# Patient Record
Sex: FEMALE | HISPANIC OR LATINO | Employment: UNEMPLOYED | URBAN - METROPOLITAN AREA
[De-identification: names, ages, dates, MRNs, and addresses within clinical notes are randomized per-mention and may not be internally consistent; named-entity substitution may affect disease eponyms.]

---

## 2019-10-15 ENCOUNTER — OFFICE VISIT (OUTPATIENT)
Dept: MEDICAL GROUP | Facility: MEDICAL CENTER | Age: 64
End: 2019-10-15
Payer: MEDICARE

## 2019-10-15 VITALS
SYSTOLIC BLOOD PRESSURE: 122 MMHG | RESPIRATION RATE: 16 BRPM | HEART RATE: 60 BPM | OXYGEN SATURATION: 97 % | HEIGHT: 60 IN | DIASTOLIC BLOOD PRESSURE: 64 MMHG | WEIGHT: 155.2 LBS | BODY MASS INDEX: 30.47 KG/M2 | TEMPERATURE: 97.5 F

## 2019-10-15 DIAGNOSIS — Z13.220 LIPID SCREENING: ICD-10-CM

## 2019-10-15 DIAGNOSIS — R73.09 ELEVATED HEMOGLOBIN A1C: ICD-10-CM

## 2019-10-15 DIAGNOSIS — F32.2 CURRENT SEVERE EPISODE OF MAJOR DEPRESSIVE DISORDER WITHOUT PSYCHOTIC FEATURES WITHOUT PRIOR EPISODE (HCC): ICD-10-CM

## 2019-10-15 DIAGNOSIS — Z23 NEED FOR VACCINATION: ICD-10-CM

## 2019-10-15 DIAGNOSIS — Z78.0 POSTMENOPAUSAL STATUS: ICD-10-CM

## 2019-10-15 DIAGNOSIS — K21.9 GASTROESOPHAGEAL REFLUX DISEASE WITHOUT ESOPHAGITIS: ICD-10-CM

## 2019-10-15 DIAGNOSIS — Z12.39 SCREENING FOR BREAST CANCER: ICD-10-CM

## 2019-10-15 PROBLEM — F32.9 CURRENT EPISODE OF MAJOR DEPRESSIVE DISORDER WITHOUT PRIOR EPISODE: Status: ACTIVE | Noted: 2019-10-15

## 2019-10-15 PROCEDURE — 90662 IIV NO PRSV INCREASED AG IM: CPT | Performed by: NURSE PRACTITIONER

## 2019-10-15 PROCEDURE — 99214 OFFICE O/P EST MOD 30 MIN: CPT | Mod: 25 | Performed by: NURSE PRACTITIONER

## 2019-10-15 PROCEDURE — G0008 ADMIN INFLUENZA VIRUS VAC: HCPCS | Performed by: NURSE PRACTITIONER

## 2019-10-15 RX ORDER — OMEPRAZOLE 20 MG/1
20 CAPSULE, DELAYED RELEASE ORAL DAILY
Qty: 30 CAP | Refills: 1 | Status: SHIPPED | OUTPATIENT
Start: 2019-10-15 | End: 2020-03-27 | Stop reason: SDUPTHER

## 2019-10-15 RX ORDER — FLUOXETINE 10 MG/1
10 CAPSULE ORAL DAILY
Qty: 30 CAP | Refills: 1 | Status: SHIPPED | OUTPATIENT
Start: 2019-10-15 | End: 2019-11-12

## 2019-10-15 ASSESSMENT — PATIENT HEALTH QUESTIONNAIRE - PHQ9
2. FEELING DOWN, DEPRESSED, IRRITABLE, OR HOPELESS: 3
3. TROUBLE FALLING OR STAYING ASLEEP OR SLEEPING TOO MUCH: 3
5. POOR APPETITE OR OVEREATING: 3
6. FEELING BAD ABOUT YOURSELF - OR THAT YOU ARE A FAILURE OR HAVE LET YOURSELF OR YOUR FAMILY DOWN: 3
7. TROUBLE CONCENTRATING ON THINGS, SUCH AS READING THE NEWSPAPER OR WATCHING TELEVISION: 3
4. FEELING TIRED OR HAVING LITTLE ENERGY: 3
9. THOUGHTS THAT YOU WOULD BE BETTER OFF DEAD, OR OF HURTING YOURSELF: 0
SUM OF ALL RESPONSES TO PHQ QUESTIONS 1-9: 21
CLINICAL INTERPRETATION OF PHQ2 SCORE: 0
SUM OF ALL RESPONSES TO PHQ9 QUESTIONS 1 AND 2: 6
1. LITTLE INTEREST OR PLEASURE IN DOING THINGS: 3
8. MOVING OR SPEAKING SO SLOWLY THAT OTHER PEOPLE COULD HAVE NOTICED. OR THE OPPOSITE, BEING SO FIGETY OR RESTLESS THAT YOU HAVE BEEN MOVING AROUND A LOT MORE THAN USUAL: 0

## 2019-10-15 NOTE — ASSESSMENT & PLAN NOTE
Chronic issue dating back years, seems to wax and wane. Worse now in the 2 weeks or so. No dietary changes. Feels burning sensation, nausea, belching.  No particular triggers, she is not recently been taking any medications for this.  No weight loss, vomiting, some dysphasia, chronic cough reported

## 2019-10-15 NOTE — PROGRESS NOTES
Subjective:     Chief Complaint   Patient presents with   • Abdominal Pain   • Depression     Cathy Wilkinson is a 64 y.o. female established patient who was last seen by me in 2016, here today with her daughter who is helping with translation.  We discussed:    Gastroesophageal reflux disease without esophagitis  Chronic issue dating back years, seems to wax and wane. Worse now in the 2 weeks or so. No dietary changes. Feels burning sensation, nausea, belching.  No particular triggers, she is not recently been taking any medications for this.  No weight loss, vomiting, some dysphasia, chronic cough reported    Current episode of major depressive disorder without prior episode  More difficulty with moodiness, irritability, feeling depressed after having lost her mother.  She is having more conflicts with her daughter which is creating additional strain, she does feel that she needs medication to help with her mood.  No SI/HI, history of manic behavior, substance abuse  Patient Health Questionaire    Little interest or pleasure in doing things?: 3   Feeling down, depressed, or hopeless?: 3  Trouble falling or staying asleep, or sleeping too much? : 3  Feeling tired or having little energy? : 3  Poor appetite or overeating? : 3  Feeling bad about yourself - or that you are a failure or have let yourself or your family down? : 3  Trouble concentrating on things, such as reading the newspaper or watching television? : 3  Moving or speaking so slowly that other people could have noticed.  Or the opposite - being so fidgety or restless that you have been moving around alot more than usual. : 0  Thoughts that you would be better off dead, or of hurting yourself?: 0  Patient Health Questionnaire Score: 21    If depressive symptoms identified deferred to follow up visit unless specifically addressed in assesment and plan.    Interpretation of PHQ-9 Total Score   Score Severity   1-4 No Depression   5-9 Mild Depression    10-14 Moderate Depression   15-19 Moderately Severe Depression   20-27 Severe Depression           Current medicines (including changes today)  Current Outpatient Medications   Medication Sig Dispense Refill   • omeprazole (PRILOSEC) 20 MG delayed-release capsule Take 1 Cap by mouth every day. 30 Cap 1   • FLUoxetine (PROZAC) 10 MG Cap Take 1 Cap by mouth every day. 30 Cap 1   • aspirin (ASA) 81 MG CHEW Take 81 mg by mouth every day.       No current facility-administered medications for this visit.      She  has no past medical history on file.    ROS included above     Objective:     /64 (BP Location: Left arm, Patient Position: Sitting, BP Cuff Size: Adult)   Pulse 60   Temp 36.4 °C (97.5 °F) (Temporal)   Resp 16   Ht 1.524 m (5')   Wt 70.4 kg (155 lb 3.3 oz)   SpO2 97%  Body mass index is 30.31 kg/m².     Physical Exam:  General: Alert, oriented in no acute distress.  Eye contact is good, speech is normal, affect calm  Lungs: clear to auscultation bilaterally, normal effort, no wheeze/ rhonchi/ rales.  CV: regular rate and rhythm, S1, S2, no murmur  Abdomen: soft, nontender, nondistended  Ext: no edema, color normal, vascularity normal, temperature normal    Assessment and Plan:   The following treatment plan was discussed   1. Current severe episode of major depressive disorder without psychotic features without prior episode (HCC)   treatment options reviewed, will start 10 mg Prozac daily.  Risks and benefits of SSRIs discussed.  Side effects reviewed, follow-up in 1 month  FLUoxetine (PROZAC) 10 MG Cap   2. Gastroesophageal reflux disease without esophagitis   persistent difficulty, worse in the last 2 weeks.  Screen for H pylori.  Dietary factors discussed including caffeine, alcohol.  She is a non-smoker.  Will start omeprazole, instructions for use discussed, follow-up at next visit  H.PYLORI STOOL ANTIGEN    Comp Metabolic Panel    omeprazole (PRILOSEC) 20 MG delayed-release capsule   3.  Elevated hemoglobin A1c   review of labs shows very slightly elevated A1c in 2016.  Update labs  HEMOGLOBIN A1C   4. Postmenopausal status  DS-BONE DENSITY STUDY (DEXA)   5. Screening for breast cancer  MA-SCREEN MAMMO W/CAD-BILAT   6. Lipid screening  Lipid Profile   7. Need for vaccination  I have placed the below orders and discussed them with an approved delegating provider. The MA is performing the below orders under the direction of Dr. Lema  Influenza Vaccine, High Dose (65+ Only)       Followup: Return in about 1 month (around 11/15/2019).         Please note that this dictation was created using voice recognition software. I have worked with consultants from the vendor as well as technical experts from [employer] [facility] [employer] to optimize the interface. I have made every reasonable attempt to correct obvious errors, but I expect that there are errors of grammar and possibly content that I did not discover before finalizing the note.

## 2019-10-15 NOTE — ASSESSMENT & PLAN NOTE
More difficulty with moodiness, irritability, feeling depressed after having lost her mother.  She is having more conflicts with her daughter which is creating additional strain, she does feel that she needs medication to help with her mood.  No SI/HI, history of manic behavior, substance abuse  Patient Health Questionaire    Little interest or pleasure in doing things?: 3   Feeling down, depressed, or hopeless?: 3  Trouble falling or staying asleep, or sleeping too much? : 3  Feeling tired or having little energy? : 3  Poor appetite or overeating? : 3  Feeling bad about yourself - or that you are a failure or have let yourself or your family down? : 3  Trouble concentrating on things, such as reading the newspaper or watching television? : 3  Moving or speaking so slowly that other people could have noticed.  Or the opposite - being so fidgety or restless that you have been moving around alot more than usual. : 0  Thoughts that you would be better off dead, or of hurting yourself?: 0  Patient Health Questionnaire Score: 21    If depressive symptoms identified deferred to follow up visit unless specifically addressed in assesment and plan.    Interpretation of PHQ-9 Total Score   Score Severity   1-4 No Depression   5-9 Mild Depression   10-14 Moderate Depression   15-19 Moderately Severe Depression   20-27 Severe Depression

## 2019-10-22 ENCOUNTER — HOSPITAL ENCOUNTER (OUTPATIENT)
Dept: LAB | Facility: MEDICAL CENTER | Age: 64
End: 2019-10-22
Attending: NURSE PRACTITIONER
Payer: MEDICARE

## 2019-10-22 DIAGNOSIS — K21.9 GASTROESOPHAGEAL REFLUX DISEASE WITHOUT ESOPHAGITIS: ICD-10-CM

## 2019-10-22 DIAGNOSIS — Z13.220 LIPID SCREENING: ICD-10-CM

## 2019-10-22 DIAGNOSIS — R73.09 ELEVATED HEMOGLOBIN A1C: ICD-10-CM

## 2019-10-22 LAB
ALBUMIN SERPL BCP-MCNC: 4 G/DL (ref 3.2–4.9)
ALBUMIN/GLOB SERPL: 1.3 G/DL
ALP SERPL-CCNC: 81 U/L (ref 30–99)
ALT SERPL-CCNC: 20 U/L (ref 2–50)
ANION GAP SERPL CALC-SCNC: 9 MMOL/L (ref 0–11.9)
AST SERPL-CCNC: 19 U/L (ref 12–45)
BILIRUB SERPL-MCNC: 0.5 MG/DL (ref 0.1–1.5)
BUN SERPL-MCNC: 12 MG/DL (ref 8–22)
CALCIUM SERPL-MCNC: 9 MG/DL (ref 8.5–10.5)
CHLORIDE SERPL-SCNC: 106 MMOL/L (ref 96–112)
CHOLEST SERPL-MCNC: 195 MG/DL (ref 100–199)
CO2 SERPL-SCNC: 27 MMOL/L (ref 20–33)
CREAT SERPL-MCNC: 0.61 MG/DL (ref 0.5–1.4)
EST. AVERAGE GLUCOSE BLD GHB EST-MCNC: 128 MG/DL
GLOBULIN SER CALC-MCNC: 3.2 G/DL (ref 1.9–3.5)
GLUCOSE SERPL-MCNC: 81 MG/DL (ref 65–99)
HBA1C MFR BLD: 6.1 % (ref 0–5.6)
HDLC SERPL-MCNC: 75 MG/DL
LDLC SERPL CALC-MCNC: 99 MG/DL
POTASSIUM SERPL-SCNC: 3.8 MMOL/L (ref 3.6–5.5)
PROT SERPL-MCNC: 7.2 G/DL (ref 6–8.2)
SODIUM SERPL-SCNC: 142 MMOL/L (ref 135–145)
TRIGL SERPL-MCNC: 105 MG/DL (ref 0–149)

## 2019-10-22 PROCEDURE — 80061 LIPID PANEL: CPT | Mod: GA

## 2019-10-22 PROCEDURE — 36415 COLL VENOUS BLD VENIPUNCTURE: CPT | Mod: GA

## 2019-10-22 PROCEDURE — 83036 HEMOGLOBIN GLYCOSYLATED A1C: CPT | Mod: GA

## 2019-10-22 PROCEDURE — 80053 COMPREHEN METABOLIC PANEL: CPT

## 2019-10-23 ENCOUNTER — HOSPITAL ENCOUNTER (OUTPATIENT)
Facility: MEDICAL CENTER | Age: 64
End: 2019-10-23
Attending: NURSE PRACTITIONER
Payer: MEDICARE

## 2019-10-23 DIAGNOSIS — K21.9 GASTROESOPHAGEAL REFLUX DISEASE WITHOUT ESOPHAGITIS: ICD-10-CM

## 2019-10-23 LAB — H PYLORI AG STL QL IA: DETECTED

## 2019-10-23 PROCEDURE — 87338 HPYLORI STOOL AG IA: CPT | Mod: GZ

## 2019-11-12 ENCOUNTER — OFFICE VISIT (OUTPATIENT)
Dept: MEDICAL GROUP | Facility: MEDICAL CENTER | Age: 64
End: 2019-11-12
Payer: MEDICARE

## 2019-11-12 VITALS
BODY MASS INDEX: 30.82 KG/M2 | SYSTOLIC BLOOD PRESSURE: 102 MMHG | WEIGHT: 157 LBS | HEIGHT: 60 IN | RESPIRATION RATE: 16 BRPM | DIASTOLIC BLOOD PRESSURE: 70 MMHG | HEART RATE: 80 BPM | TEMPERATURE: 97.6 F | OXYGEN SATURATION: 95 %

## 2019-11-12 DIAGNOSIS — Z23 NEED FOR VACCINATION: ICD-10-CM

## 2019-11-12 DIAGNOSIS — A04.8 H. PYLORI INFECTION: ICD-10-CM

## 2019-11-12 DIAGNOSIS — F32.2 CURRENT SEVERE EPISODE OF MAJOR DEPRESSIVE DISORDER WITHOUT PSYCHOTIC FEATURES WITHOUT PRIOR EPISODE (HCC): ICD-10-CM

## 2019-11-12 DIAGNOSIS — K21.9 GASTROESOPHAGEAL REFLUX DISEASE WITHOUT ESOPHAGITIS: ICD-10-CM

## 2019-11-12 PROCEDURE — 90670 PCV13 VACCINE IM: CPT | Performed by: NURSE PRACTITIONER

## 2019-11-12 PROCEDURE — 99214 OFFICE O/P EST MOD 30 MIN: CPT | Mod: 25 | Performed by: NURSE PRACTITIONER

## 2019-11-12 PROCEDURE — G0009 ADMIN PNEUMOCOCCAL VACCINE: HCPCS | Performed by: NURSE PRACTITIONER

## 2019-11-12 RX ORDER — OMEPRAZOLE 20 MG/1
20 CAPSULE, DELAYED RELEASE ORAL 2 TIMES DAILY
Qty: 28 CAP | Refills: 0 | Status: SHIPPED | OUTPATIENT
Start: 2019-11-12 | End: 2019-12-10

## 2019-11-12 RX ORDER — FLUOXETINE HYDROCHLORIDE 20 MG/1
20 CAPSULE ORAL DAILY
Qty: 30 CAP | Refills: 1 | Status: SHIPPED | OUTPATIENT
Start: 2019-11-12 | End: 2019-12-10 | Stop reason: SDUPTHER

## 2019-11-12 RX ORDER — AMOXICILLIN 500 MG/1
1000 CAPSULE ORAL 2 TIMES DAILY
Qty: 56 CAP | Refills: 0 | Status: SHIPPED | OUTPATIENT
Start: 2019-11-12 | End: 2019-12-10

## 2019-11-12 RX ORDER — CLARITHROMYCIN 500 MG/1
500 TABLET, COATED ORAL 2 TIMES DAILY
Qty: 28 TAB | Refills: 0 | Status: SHIPPED | OUTPATIENT
Start: 2019-11-12 | End: 2019-12-10

## 2019-11-12 ASSESSMENT — PATIENT HEALTH QUESTIONNAIRE - PHQ9
2. FEELING DOWN, DEPRESSED, IRRITABLE, OR HOPELESS: 0
1. LITTLE INTEREST OR PLEASURE IN DOING THINGS: 2
4. FEELING TIRED OR HAVING LITTLE ENERGY: 1
SUM OF ALL RESPONSES TO PHQ QUESTIONS 1-9: 13
7. TROUBLE CONCENTRATING ON THINGS, SUCH AS READING THE NEWSPAPER OR WATCHING TELEVISION: 2
9. THOUGHTS THAT YOU WOULD BE BETTER OFF DEAD, OR OF HURTING YOURSELF: 0
5. POOR APPETITE OR OVEREATING: 2
3. TROUBLE FALLING OR STAYING ASLEEP OR SLEEPING TOO MUCH: 3
8. MOVING OR SPEAKING SO SLOWLY THAT OTHER PEOPLE COULD HAVE NOTICED. OR THE OPPOSITE, BEING SO FIGETY OR RESTLESS THAT YOU HAVE BEEN MOVING AROUND A LOT MORE THAN USUAL: 0
6. FEELING BAD ABOUT YOURSELF - OR THAT YOU ARE A FAILURE OR HAVE LET YOURSELF OR YOUR FAMILY DOWN: 3
SUM OF ALL RESPONSES TO PHQ9 QUESTIONS 1 AND 2: 2

## 2019-11-12 NOTE — ASSESSMENT & PLAN NOTE
Started prozac 10 mg daily one month ago.  She does feel that she has had some improvement over the last few weeks although still feeling somewhat depressed.  She is more relaxed.  She did feel that the medication made her jittery the first few days but this is subsided.  No nausea or headache related to medication.  Last PHQ 9 score 21  Patient Health Questionaire    Little interest or pleasure in doing things?: 2   Feeling down, depressed, or hopeless?: 0  Trouble falling or staying asleep, or sleeping too much? : 3  Feeling tired or having little energy? : 1  Poor appetite or overeating? : 2  Feeling bad about yourself - or that you are a failure or have let yourself or your family down? : 3  Trouble concentrating on things, such as reading the newspaper or watching television? : 2  Moving or speaking so slowly that other people could have noticed.  Or the opposite - being so fidgety or restless that you have been moving around alot more than usual. : 0  Thoughts that you would be better off dead, or of hurting yourself?: 0  Patient Health Questionnaire Score: 13    If depressive symptoms identified deferred to follow up visit unless specifically addressed in assesment and plan.    Interpretation of PHQ-9 Total Score   Score Severity   1-4 No Depression   5-9 Mild Depression   10-14 Moderate Depression   15-19 Moderately Severe Depression   20-27 Severe Depression

## 2019-11-12 NOTE — ASSESSMENT & PLAN NOTE
Pt initially seen with upper abd pain and reflux in Oct. Screening for h. Pylori was positive, unfortunately we had difficulty contacting the patient so she has not yet been treated. She does report symptoms have improved with omeprazole 20 mg daily.  No nausea, bloating, significant pain, stool changes

## 2019-11-12 NOTE — PROGRESS NOTES
Subjective:     Chief Complaint   Patient presents with   • Follow-Up     Cathy Wilkinson is a 64 y.o. female here today to follow up on:    H. pylori infection  Pt initially seen with upper abd pain and reflux in Oct. Screening for h. Pylori was positive, unfortunately we had difficulty contacting the patient so she has not yet been treated. She does report symptoms have improved with omeprazole 20 mg daily.  No nausea, bloating, significant pain, stool changes    Current episode of major depressive disorder without prior episode  Started prozac 10 mg daily one month ago.  She does feel that she has had some improvement over the last few weeks although still feeling somewhat depressed.  She is more relaxed.  She did feel that the medication made her jittery the first few days but this is subsided.  No nausea or headache related to medication.  Last PHQ 9 score 21  Patient Health Questionaire    Little interest or pleasure in doing things?: 2   Feeling down, depressed, or hopeless?: 0  Trouble falling or staying asleep, or sleeping too much? : 3  Feeling tired or having little energy? : 1  Poor appetite or overeating? : 2  Feeling bad about yourself - or that you are a failure or have let yourself or your family down? : 3  Trouble concentrating on things, such as reading the newspaper or watching television? : 2  Moving or speaking so slowly that other people could have noticed.  Or the opposite - being so fidgety or restless that you have been moving around alot more than usual. : 0  Thoughts that you would be better off dead, or of hurting yourself?: 0  Patient Health Questionnaire Score: 13    If depressive symptoms identified deferred to follow up visit unless specifically addressed in assesment and plan.    Interpretation of PHQ-9 Total Score   Score Severity   1-4 No Depression   5-9 Mild Depression   10-14 Moderate Depression   15-19 Moderately Severe Depression   20-27 Severe Depression           Current  medicines (including changes today)  Current Outpatient Medications   Medication Sig Dispense Refill   • clarithromycin (BIAXIN) 500 MG Tab Take 1 Tab by mouth 2 times a day. 28 Tab 0   • amoxicillin (AMOXIL) 500 MG Cap Take 2 Caps by mouth 2 times a day. 56 Cap 0   • omeprazole (PRILOSEC) 20 MG delayed-release capsule Take 1 Cap by mouth 2 times a day. 28 Cap 0   • FLUoxetine (PROZAC) 20 MG Cap Take 1 Cap by mouth every day. 30 Cap 1   • omeprazole (PRILOSEC) 20 MG delayed-release capsule Take 1 Cap by mouth every day. 30 Cap 1   • aspirin (ASA) 81 MG CHEW Take 81 mg by mouth every day.       No current facility-administered medications for this visit.      She  has no past medical history on file.    ROS included above     Objective:     /70 (BP Location: Left arm, Patient Position: Sitting, BP Cuff Size: Adult)   Pulse 80   Temp 36.4 °C (97.6 °F) (Temporal)   Resp 16   Ht 1.524 m (5')   Wt 71.2 kg (157 lb)   SpO2 95%  Body mass index is 30.66 kg/m².     Physical Exam:  General: Alert, oriented in no acute distress.  Eye contact is good, speech is normal, affect calm  Lungs: clear to auscultation bilaterally, normal effort, no wheeze/ rhonchi/ rales.  CV: regular rate and rhythm, S1, S2, no murmur  Abdomen: soft, nontender, nondistended  Ext: no edema, color normal, vascularity normal, temperature normal    Assessment and Plan:   The following treatment plan was discussed  1. H. pylori infection   positive stool test for H. pylori.  Antibiotic treatment plan reviewed increase omeprazole to twice daily x2 weeks.  We will follow-up on this at next visit  clarithromycin (BIAXIN) 500 MG Tab    amoxicillin (AMOXIL) 500 MG Cap    omeprazole (PRILOSEC) 20 MG delayed-release capsule   2. Gastroesophageal reflux disease without esophagitis   as discussed above   3. Current severe episode of major depressive disorder without psychotic features without prior episode (HCC)   some improvement with PHQ 9 score  reducing from 21-13 although she does feel that she may do better with an increased dose.  We will increase Prozac to 20 mg daily, follow-up in 1 month   4. Need for vaccination  I have placed the below orders and discussed them with an approved delegating provider. The MA is performing the below orders under the direction of Dr. Lema  Pneumococcal Conjugate Vaccine 13-Valent       Followup: 1 month         Please note that this dictation was created using voice recognition software. I have worked with consultants from the vendor as well as technical experts from [employer] to optimize the interface. I have made every reasonable attempt to correct obvious errors, but I expect that there are errors of grammar and possibly content that I did not discover before finalizing the note.

## 2019-11-19 ENCOUNTER — HOSPITAL ENCOUNTER (OUTPATIENT)
Dept: RADIOLOGY | Facility: MEDICAL CENTER | Age: 64
End: 2019-11-19
Attending: NURSE PRACTITIONER
Payer: MEDICARE

## 2019-11-19 DIAGNOSIS — Z12.31 VISIT FOR SCREENING MAMMOGRAM: ICD-10-CM

## 2019-11-19 DIAGNOSIS — Z78.0 POSTMENOPAUSAL STATUS: ICD-10-CM

## 2019-11-19 PROCEDURE — 77063 BREAST TOMOSYNTHESIS BI: CPT

## 2019-11-19 PROCEDURE — 77080 DXA BONE DENSITY AXIAL: CPT

## 2019-12-10 ENCOUNTER — HOSPITAL ENCOUNTER (OUTPATIENT)
Facility: MEDICAL CENTER | Age: 64
End: 2019-12-10
Attending: NURSE PRACTITIONER
Payer: MEDICARE

## 2019-12-10 ENCOUNTER — OFFICE VISIT (OUTPATIENT)
Dept: MEDICAL GROUP | Facility: MEDICAL CENTER | Age: 64
End: 2019-12-10
Payer: MEDICARE

## 2019-12-10 VITALS
DIASTOLIC BLOOD PRESSURE: 70 MMHG | BODY MASS INDEX: 30.43 KG/M2 | OXYGEN SATURATION: 97 % | SYSTOLIC BLOOD PRESSURE: 108 MMHG | RESPIRATION RATE: 16 BRPM | HEART RATE: 84 BPM | HEIGHT: 60 IN | TEMPERATURE: 96.7 F | WEIGHT: 155 LBS

## 2019-12-10 DIAGNOSIS — Z01.419 WELL WOMAN EXAM WITH ROUTINE GYNECOLOGICAL EXAM: ICD-10-CM

## 2019-12-10 DIAGNOSIS — F32.2 CURRENT SEVERE EPISODE OF MAJOR DEPRESSIVE DISORDER WITHOUT PSYCHOTIC FEATURES WITHOUT PRIOR EPISODE (HCC): ICD-10-CM

## 2019-12-10 DIAGNOSIS — A04.8 H. PYLORI INFECTION: ICD-10-CM

## 2019-12-10 PROCEDURE — 87624 HPV HI-RISK TYP POOLED RSLT: CPT

## 2019-12-10 PROCEDURE — G0101 CA SCREEN;PELVIC/BREAST EXAM: HCPCS | Performed by: NURSE PRACTITIONER

## 2019-12-10 PROCEDURE — 88175 CYTOPATH C/V AUTO FLUID REDO: CPT

## 2019-12-10 PROCEDURE — 99213 OFFICE O/P EST LOW 20 MIN: CPT | Mod: 25 | Performed by: NURSE PRACTITIONER

## 2019-12-10 RX ORDER — FLUOXETINE HYDROCHLORIDE 20 MG/1
20 CAPSULE ORAL DAILY
Qty: 90 CAP | Refills: 3 | Status: SHIPPED | OUTPATIENT
Start: 2019-12-10 | End: 2021-04-06 | Stop reason: SDUPTHER

## 2019-12-10 NOTE — PROGRESS NOTES
CC:  Pap/Well Woman Exam    History of present illness:  Cathy Wilkinson is 64 y.o. G7, P6 postmenopausal female presenting today for well woman exam with gynecological exam and Pap smear.   Last Pap about 4 years ago, normal.  Not sexually active.  Up-to-date on mammogram  H. pylori infection  Completed antibiotic treatment about 2 weeks ago, she has continued with omeprazole.  Denies any acid reflux, abdominal bloating or discomfort    Current episode of major depressive disorder without prior episode  Improved with Prozac 20 mg daily, denies any current concerns with depression, anxiety      No past medical history on file.    No past surgical history on file.    Outpatient Encounter Medications as of 12/10/2019   Medication Sig Dispense Refill   • FLUoxetine (PROZAC) 20 MG Cap Take 1 Cap by mouth every day. 30 Cap 1   • omeprazole (PRILOSEC) 20 MG delayed-release capsule Take 1 Cap by mouth every day. 30 Cap 1   • aspirin (ASA) 81 MG CHEW Take 81 mg by mouth every day.     • [DISCONTINUED] clarithromycin (BIAXIN) 500 MG Tab Take 1 Tab by mouth 2 times a day. (Patient not taking: Reported on 12/10/2019) 28 Tab 0   • [DISCONTINUED] amoxicillin (AMOXIL) 500 MG Cap Take 2 Caps by mouth 2 times a day. (Patient not taking: Reported on 12/10/2019) 56 Cap 0   • [DISCONTINUED] omeprazole (PRILOSEC) 20 MG delayed-release capsule Take 1 Cap by mouth 2 times a day. (Patient not taking: Reported on 12/10/2019) 28 Cap 0     No facility-administered encounter medications on file as of 12/10/2019.        Patient Active Problem List    Diagnosis Date Noted   • H. pylori infection 11/12/2019   • Current episode of major depressive disorder without prior episode 10/15/2019   • Vertigo 10/20/2016   • Preventative health care 10/20/2016   • Gastroesophageal reflux disease without esophagitis 02/18/2016   • Chronic constipation 02/18/2016       .  Social History     Socioeconomic History   • Marital status: Unknown     Spouse name:  Not on file   • Number of children: Not on file   • Years of education: Not on file   • Highest education level: Not on file   Occupational History   • Not on file   Social Needs   • Financial resource strain: Not on file   • Food insecurity:     Worry: Not on file     Inability: Not on file   • Transportation needs:     Medical: Not on file     Non-medical: Not on file   Tobacco Use   • Smoking status: Never Smoker   • Smokeless tobacco: Never Used   Substance and Sexual Activity   • Alcohol use: Yes     Alcohol/week: 0.0 oz   • Drug use: No   • Sexual activity: Not on file   Lifestyle   • Physical activity:     Days per week: Not on file     Minutes per session: Not on file   • Stress: Not on file   Relationships   • Social connections:     Talks on phone: Not on file     Gets together: Not on file     Attends Jewish service: Not on file     Active member of club or organization: Not on file     Attends meetings of clubs or organizations: Not on file     Relationship status: Not on file   • Intimate partner violence:     Fear of current or ex partner: Not on file     Emotionally abused: Not on file     Physically abused: Not on file     Forced sexual activity: Not on file   Other Topics Concern   • Not on file   Social History Narrative   • Not on file       Family History   Problem Relation Age of Onset   • Lung Disease Father    • Cancer Brother    • Cancer Paternal Aunt         breast         ROS: Denies Weight loss, fatigue, chest pain, SOB, bowel or bladder changes. No concerning vaginal discharge. Denies h/o migraine with aura. Denies musculoskeletal, neurological, or psychiatric problems.      /70 (BP Location: Left arm, Patient Position: Sitting, BP Cuff Size: Adult)   Pulse 84   Temp 35.9 °C (96.7 °F) (Temporal)   Resp 16   Ht 1.524 m (5')   Wt 70.3 kg (155 lb)   SpO2 97%   BMI 30.27 kg/m²     GEN:  Appears well and in no apparent distress   NECK:  Supple without adenopathy or  thyromegaly  LUNGS:  Clear and equal. No wheeze, ronchi, or rales.  CV:  RRR, S1, S2. No murmur.  Pedal pulses 2+ bilaterally.  BREAST:  Symmetrical without masses. No nipple discharge.  ABD:  Soft, non-tender, non-distended, normal bowel sounds.  No hepatosplenomegaly.  :  Normal external female genitalia.  Vaginal canal clear.  Cervix appears normal. Specimen collected from transformation zone. Bimanual exam:  No CMT, normal size uterus without masses or tenderness; no adnexal masses or tenderness.      Assessment and plan  1. Well woman exam with routine gynecological exam  Normal GYN exam. Pap sent, follow-up pending results. General health and wellness discussion including healthy diet, regular exercise.   - THINPREP PAP WITH HPV; Future    2. H. pylori infection  Completed antibiotic treatment, she may now discontinue omeprazole.  Notify me for recurrence of GERD    3. Current severe episode of major depressive disorder without psychotic features without prior episode (HCC)  Doing well with Prozac, continue present dose      F/u pending results

## 2019-12-10 NOTE — ASSESSMENT & PLAN NOTE
Completed antibiotic treatment about 2 weeks ago, she has continued with omeprazole.  Denies any acid reflux, abdominal bloating or discomfort

## 2019-12-11 LAB
CYTOLOGY REG CYTOL: NORMAL
HPV HR 12 DNA CVX QL NAA+PROBE: NEGATIVE
HPV16 DNA SPEC QL NAA+PROBE: NEGATIVE
HPV18 DNA SPEC QL NAA+PROBE: NEGATIVE
SPECIMEN SOURCE: NORMAL

## 2020-03-27 ENCOUNTER — PATIENT MESSAGE (OUTPATIENT)
Dept: MEDICAL GROUP | Facility: MEDICAL CENTER | Age: 65
End: 2020-03-27

## 2020-03-27 DIAGNOSIS — K21.9 GASTROESOPHAGEAL REFLUX DISEASE WITHOUT ESOPHAGITIS: ICD-10-CM

## 2020-03-27 RX ORDER — OMEPRAZOLE 20 MG/1
20 CAPSULE, DELAYED RELEASE ORAL DAILY
Qty: 30 CAP | Refills: 1 | Status: SHIPPED | OUTPATIENT
Start: 2020-03-27 | End: 2021-04-06

## 2020-05-13 ENCOUNTER — PATIENT MESSAGE (OUTPATIENT)
Dept: MEDICAL GROUP | Facility: MEDICAL CENTER | Age: 65
End: 2020-05-13

## 2020-05-13 DIAGNOSIS — K21.9 GASTROESOPHAGEAL REFLUX DISEASE WITHOUT ESOPHAGITIS: ICD-10-CM

## 2020-05-13 DIAGNOSIS — A04.8 H. PYLORI INFECTION: ICD-10-CM

## 2020-05-13 RX ORDER — OMEPRAZOLE 40 MG/1
40 CAPSULE, DELAYED RELEASE ORAL DAILY
Qty: 30 CAP | Refills: 0 | Status: SHIPPED | OUTPATIENT
Start: 2020-05-13 | End: 2021-04-06 | Stop reason: SDUPTHER

## 2020-05-13 NOTE — PATIENT COMMUNICATION
Phone Number Called: [phone] (home)     Call outcome: Spoke to patient regarding message below.    Message: Called and spoke to patients daughter about medication for gastrointestinal problems, pt was taking omeprazole 40 mg along with an antibiotic, pt does not remember which antibiotic, on file is amoxicillin 500mg

## 2020-06-16 ENCOUNTER — HOSPITAL ENCOUNTER (OUTPATIENT)
Facility: MEDICAL CENTER | Age: 65
End: 2020-06-16
Attending: NURSE PRACTITIONER
Payer: MEDICARE

## 2020-06-16 DIAGNOSIS — A04.8 H. PYLORI INFECTION: ICD-10-CM

## 2020-06-16 DIAGNOSIS — K21.9 GASTROESOPHAGEAL REFLUX DISEASE WITHOUT ESOPHAGITIS: ICD-10-CM

## 2020-06-16 PROCEDURE — 87338 HPYLORI STOOL AG IA: CPT | Mod: GZ

## 2020-06-17 ENCOUNTER — PATIENT MESSAGE (OUTPATIENT)
Dept: MEDICAL GROUP | Facility: MEDICAL CENTER | Age: 65
End: 2020-06-17

## 2020-06-17 LAB — H PYLORI AG STL QL IA: NOT DETECTED

## 2020-06-18 ENCOUNTER — TELEPHONE (OUTPATIENT)
Dept: MEDICAL GROUP | Facility: MEDICAL CENTER | Age: 65
End: 2020-06-18

## 2020-06-18 NOTE — TELEPHONE ENCOUNTER
Phone Number Called: [phone]       Call outcome: Left detailed message for patient. Informed to call back with any additional questions.    Message: Left voicemail about negative test results and informed patient to call back if she has any questions or concerns.

## 2021-03-03 DIAGNOSIS — Z23 NEED FOR VACCINATION: ICD-10-CM

## 2021-03-23 ENCOUNTER — IMMUNIZATION (OUTPATIENT)
Dept: FAMILY PLANNING/WOMEN'S HEALTH CLINIC | Facility: IMMUNIZATION CENTER | Age: 66
End: 2021-03-23
Attending: INTERNAL MEDICINE
Payer: MEDICARE

## 2021-03-23 DIAGNOSIS — Z23 NEED FOR VACCINATION: ICD-10-CM

## 2021-03-23 DIAGNOSIS — Z23 ENCOUNTER FOR VACCINATION: Primary | ICD-10-CM

## 2021-03-23 PROCEDURE — 0001A PFIZER SARS-COV-2 VACCINE: CPT

## 2021-03-23 PROCEDURE — 91300 PFIZER SARS-COV-2 VACCINE: CPT

## 2021-04-06 ENCOUNTER — PATIENT MESSAGE (OUTPATIENT)
Dept: MEDICAL GROUP | Facility: MEDICAL CENTER | Age: 66
End: 2021-04-06

## 2021-04-06 DIAGNOSIS — F32.2 CURRENT SEVERE EPISODE OF MAJOR DEPRESSIVE DISORDER WITHOUT PSYCHOTIC FEATURES WITHOUT PRIOR EPISODE (HCC): ICD-10-CM

## 2021-04-06 RX ORDER — FLUOXETINE HYDROCHLORIDE 20 MG/1
20 CAPSULE ORAL DAILY
Qty: 90 CAPSULE | Refills: 0 | Status: SHIPPED | OUTPATIENT
Start: 2021-04-06

## 2021-04-06 RX ORDER — OMEPRAZOLE 40 MG/1
40 CAPSULE, DELAYED RELEASE ORAL DAILY
Qty: 30 CAPSULE | Refills: 5 | Status: SHIPPED | OUTPATIENT
Start: 2021-04-06

## 2021-04-07 ENCOUNTER — PATIENT OUTREACH (OUTPATIENT)
Dept: SCHEDULING | Facility: IMAGING CENTER | Age: 66
End: 2021-04-07

## 2021-04-07 NOTE — PROGRESS NOTES
Attempt #1  Outreach for COVID vaccine 2nd Pfizer dose schedule.  Outcome:   rescheduled for sooner. csb

## 2021-04-09 ENCOUNTER — OFFICE VISIT (OUTPATIENT)
Dept: MEDICAL GROUP | Facility: MEDICAL CENTER | Age: 66
End: 2021-04-09
Payer: MEDICARE

## 2021-04-09 ENCOUNTER — IMMUNIZATION (OUTPATIENT)
Dept: FAMILY PLANNING/WOMEN'S HEALTH CLINIC | Facility: IMMUNIZATION CENTER | Age: 66
End: 2021-04-09
Attending: INTERNAL MEDICINE
Payer: MEDICARE

## 2021-04-09 VITALS
WEIGHT: 156.31 LBS | SYSTOLIC BLOOD PRESSURE: 120 MMHG | BODY MASS INDEX: 32.81 KG/M2 | DIASTOLIC BLOOD PRESSURE: 70 MMHG | TEMPERATURE: 97.9 F | HEIGHT: 58 IN | RESPIRATION RATE: 20 BRPM | HEART RATE: 86 BPM | OXYGEN SATURATION: 96 %

## 2021-04-09 DIAGNOSIS — R73.03 PREDIABETES: ICD-10-CM

## 2021-04-09 DIAGNOSIS — R42 DIZZINESS: ICD-10-CM

## 2021-04-09 DIAGNOSIS — F32.1 CURRENT MODERATE EPISODE OF MAJOR DEPRESSIVE DISORDER WITHOUT PRIOR EPISODE (HCC): ICD-10-CM

## 2021-04-09 DIAGNOSIS — Z12.31 ENCOUNTER FOR SCREENING MAMMOGRAM FOR MALIGNANT NEOPLASM OF BREAST: ICD-10-CM

## 2021-04-09 DIAGNOSIS — Z11.59 NEED FOR HEPATITIS C SCREENING TEST: ICD-10-CM

## 2021-04-09 DIAGNOSIS — Z23 ENCOUNTER FOR VACCINATION: Primary | ICD-10-CM

## 2021-04-09 DIAGNOSIS — K21.9 GASTROESOPHAGEAL REFLUX DISEASE WITHOUT ESOPHAGITIS: ICD-10-CM

## 2021-04-09 DIAGNOSIS — Z13.220 LIPID SCREENING: ICD-10-CM

## 2021-04-09 PROCEDURE — 91300 PFIZER SARS-COV-2 VACCINE: CPT

## 2021-04-09 PROCEDURE — 0002A PFIZER SARS-COV-2 VACCINE: CPT

## 2021-04-09 PROCEDURE — 99214 OFFICE O/P EST MOD 30 MIN: CPT | Performed by: NURSE PRACTITIONER

## 2021-04-09 RX ORDER — M-VIT,TX,IRON,MINS/CALC/FOLIC 27MG-0.4MG
1 TABLET ORAL DAILY
COMMUNITY

## 2021-04-09 ASSESSMENT — PATIENT HEALTH QUESTIONNAIRE - PHQ9
9. THOUGHTS THAT YOU WOULD BE BETTER OFF DEAD, OR OF HURTING YOURSELF: NOT AT ALL
7. TROUBLE CONCENTRATING ON THINGS, SUCH AS READING THE NEWSPAPER OR WATCHING TELEVISION: MORE THAN HALF THE DAYS
3. TROUBLE FALLING OR STAYING ASLEEP OR SLEEPING TOO MUCH: NEARLY EVERY DAY
1. LITTLE INTEREST OR PLEASURE IN DOING THINGS: NEARLY EVERY DAY
6. FEELING BAD ABOUT YOURSELF - OR THAT YOU ARE A FAILURE OR HAVE LET YOURSELF OR YOUR FAMILY DOWN: SEVERAL DAYS
SUM OF ALL RESPONSES TO PHQ QUESTIONS 1-9: 17
2. FEELING DOWN, DEPRESSED, IRRITABLE, OR HOPELESS: NEARLY EVERY DAY
5. POOR APPETITE OR OVEREATING: MORE THAN HALF THE DAYS
SUM OF ALL RESPONSES TO PHQ9 QUESTIONS 1 AND 2: 6
8. MOVING OR SPEAKING SO SLOWLY THAT OTHER PEOPLE COULD HAVE NOTICED. OR THE OPPOSITE, BEING SO FIGETY OR RESTLESS THAT YOU HAVE BEEN MOVING AROUND A LOT MORE THAN USUAL: NOT AT ALL
4. FEELING TIRED OR HAVING LITTLE ENERGY: NEARLY EVERY DAY

## 2021-04-09 NOTE — ASSESSMENT & PLAN NOTE
Was doing well on prozac 20 mg daily but ran out of medication a few months ago and had not contacted us for refill.  She is again feeling more depressed, would like to go back on the medication.  No SI/HI, no history of manic behavior

## 2021-04-09 NOTE — ASSESSMENT & PLAN NOTE
Last A1c was in prediabetic range at 6.1.  She is not really been following any particular diet, enjoys candy and soda.  She has not been exercising recently.  She does notice that she feels dizzy after eating, she does not have a glucometer.  No polyuria, polydipsia, numbness or tingling in extremities

## 2021-04-09 NOTE — ASSESSMENT & PLAN NOTE
This is been a chronic issue dating back for several years.  She initially was positive for H. pylori and treated for this.  Her reflux, however, did not resolve.  She had improved with use of omeprazole but has been out of medication for some time now.  She is again having frequent heartburn, acid regurgitation.  She does state that it is worse when she overeats, otherwise has not noted any particular dietary triggers.  She does have persistent throat irritation as well.  No dysphagia, nausea, acute abdominal pain.  No NSAID use

## 2021-04-09 NOTE — PROGRESS NOTES
Subjective:     Chief Complaint   Patient presents with   • Advice Only     vomitting and bringing up phlegm x3wks, fatigue     Cathy Wilkinson is a 65 y.o. female here today to follow up on:    Current episode of major depressive disorder without prior episode  Was doing well on prozac 20 mg daily but ran out of medication a few months ago and had not contacted us for refill.  She is again feeling more depressed, would like to go back on the medication.  No SI/HI, no history of manic behavior    Gastroesophageal reflux disease without esophagitis  This is been a chronic issue dating back for several years.  She initially was positive for H. pylori and treated for this.  Her reflux, however, did not resolve.  She had improved with use of omeprazole but has been out of medication for some time now.  She is again having frequent heartburn, acid regurgitation.  She does state that it is worse when she overeats, otherwise has not noted any particular dietary triggers.  She does have persistent throat irritation as well.  No dysphagia, nausea, acute abdominal pain.  No NSAID use    Prediabetes  Last A1c was in prediabetic range at 6.1.  She is not really been following any particular diet, enjoys candy and soda.  She has not been exercising recently.  She does notice that she feels dizzy after eating, she does not have a glucometer.  No polyuria, polydipsia, numbness or tingling in extremities       Current medicines (including changes today)  Current Outpatient Medications   Medication Sig Dispense Refill   • Calcium 200 MG Tab Take  by mouth.     • therapeutic multivitamin-minerals (THERAGRAN-M) Tab Take 1 tablet by mouth every day.     • FLUoxetine (PROZAC) 20 MG Cap Take 1 capsule by mouth every day. 90 capsule 0   • aspirin (ASA) 81 MG CHEW Take 81 mg by mouth every day.     • omeprazole (PRILOSEC) 40 MG delayed-release capsule Take 1 capsule by mouth every day. (Patient not taking: Reported on 4/9/2021) 30 capsule  "5     No current facility-administered medications for this visit.     She  has no past medical history on file.    ROS included above     Objective:     /70 (BP Location: Left arm, Patient Position: Sitting, BP Cuff Size: Adult)   Pulse 86   Temp 36.6 °C (97.9 °F) (Temporal)   Resp 20   Ht 1.473 m (4' 10\")   Wt 70.9 kg (156 lb 4.9 oz)   SpO2 96%  Body mass index is 32.67 kg/m².     Physical Exam:  General: Alert, oriented in no acute distress.  Eye contact is good, speech is normal, affect calm  Lungs: clear to auscultation bilaterally, normal effort, no wheeze/ rhonchi/ rales.  CV: regular rate and rhythm, S1, S2, no murmur  Abdomen: soft, nontender  Ext: no edema, color normal, vascularity normal, temperature normal    Assessment and Plan:   The following treatment plan was discussed   1. Current moderate episode of major depressive disorder without prior episode (HCC)   she had done better when taking the fluoxetine but discontinued sometime ago, now again feeling more depressed.  We will restart this, expected to improve over the next few weeks   2. Gastroesophageal reflux disease without esophagitis   persistent issue, currently uncontrolled.  Will resume omeprazole, instructions for medication use reviewed.  Printed instruction given on lifestyle modification.  If symptoms do not improve with PPI she will need possible EGD, they are to contact me in 2 weeks if still having issues   3. Dizziness   possibly related to hyperglycemia.  Labs as listed comp Metabolic Panel    CBC WITH DIFFERENTIAL   4. Prediabetes   last A1c 6.1, no medications for this issue  HEMOGLOBIN A1C   5. Need for hepatitis C screening test  HEP C VIRUS ANTIBODY   6. Lipid screening  Lipid Profile   7. Encounter for screening mammogram for malignant neoplasm of breast  MA-SCREENING MAMMO BILAT W/TOMOSYNTHESIS W/CAD       Followup: pending labs and 2 weeks if no improvement with PPI         Please note that this dictation was " created using voice recognition software. I have worked with consultants from the vendor as well as technical experts from [facility] to optimize the interface. I have made every reasonable attempt to correct obvious errors, but I expect that there are errors of grammar and possibly content that I did not discover before finalizing the note.

## 2021-04-09 NOTE — PATIENT INSTRUCTIONS
Acidez estomacal  Heartburn  La acidez estomacal es un tipo de dolor o malestar que puede sentirse en la garganta o en el pecho. Con frecuencia se describe nela un dolor urente (ardor). También puede causar mal sabor en la boca que se siente ácido. La acidez estomacal puede empeorar al acostarse o al inclinarse. Puede ser peor por la noche. Puede ser ocasionada porque el contenido del estómago vuelve hacia arriba (reflujo) por el tubo que conecta la boca con el estómago (esófago).  Siga estas indicaciones en solis casa:  Comida y bebida    · Evite determinados alimentos y bebidas nela se lo haya indicado el médico. Estos pueden incluir:  ? Café y té (con o sin cafeína).  ? Bebidas que contengan alcohol.  ? Bebidas energéticas y deportivas.  ? Bebidas gaseosas o refrescos.  ? Chocolate y cacao.  ? Menta y esencia de menta.  ? Fanrock y cebolla.  ? Rábano picante.  ? Alimentos ácidos y condimentados, tales nela:  § Pimientos.  § [place] en polvo y chavez en polvo.  § Vinagre.  § Salsas picantes y salsa barbacoa.  ? Los cítricos y van jugos, tales nela:  § Naranjas.  § Ethan.  § Tab.  ? Alimentos a base de tomate, tales nela:  § Salsa andrew y pizza con salsa andrew.  § [place].  § Salsa.  ? Alimentos fritos y grasosos, tales nela:  § Rosquillas.  § Sindy fritas y sindy fritas de bolsa.  § Aderezos con alto contenido de grasa.  ? Kristen con alto contenido de grasa, tales nela:  § Perros calientes y salchichas.  § Chuletas.  § Jamón y tocino.  ? Productos lácteos con alto contenido de grasa, tales nela:  § Leche entera.  § Mantequilla.  § Queso crema.  · Consuma pequeñas cantidades de comida con más frecuencia. Evite consumir porciones abundantes.  · Evite beber grandes cantidades de líquidos con las comidas.  · Evite comer 2 o 3 horas antes de acostarse.  · Evite recostarse inmediatamente después de comer.  · No aminta ejercicios enseguida después de comer.  Estilo de yumi         · Si tiene sobrepeso, baje earline cantidad de peso  saludable para usted. Consulte a solis médico para bajar de peso de manera degroot.  · No consuma ningún producto que contenga nicotina o tabaco, lo que incluye cigarrillos, cigarrillos electrónicos y tabaco de mascar. Estos pueden empeorar los síntomas. Si necesita ayuda para dejar de fumar, consulte al médico.  · Use ropa holgada. No use nada apretado alrededor de la cintura.  · Levante (eleve) la cabecera de la cama aproximadamente 6 pulgadas (15 cm) para dormir.  · Intente reducir el nivel de estrés. Si necesita ayuda para hacer esto, consulte al médico.  Indicaciones generales  · Esté atento a cualquier cambio en los síntomas.  · [place] los medicamentos de venta jerry y los recetados solamente nela se lo haya indicado el médico.  ? No tome aspirina, ibuprofeno ni otros antiinflamatorios no esteroideos (LUIS) a menos que el médico lo autorice.  ? Deje de santino medicamentos solamente nela se lo haya indicado el médico.  · Concurra a todas las visitas de seguimiento nela se lo haya indicado el médico. [place] es importante.  Comuníquese con un médico si:  · Aparecen nuevos síntomas.  · Adelgaza y no sabe por qué está sucediendo esto.  · Tiene problemas para tragar, o le duele cuando traga.  · Tiene sibilancia o tos persistente.  · Los síntomas no mejoran con el tratamiento.  · Tiene acidez estomacal con frecuencia kori más de 2 semanas.  Solicite ayuda inmediatamente si:  · Siente dolor en los brazos, el rachel, la mandíbula, los dientes o la espalda.  · Se siente transpirado, mareado o tiene earline sensación de desvanecimiento.  · Siente falta de aire o dolor en el pecho.  · Vomita, y el vómito tiene un aspecto similar a la demetrius o a los posos de café.  · Las deposiciones (heces) son sanguinolentas o negras.  Estos síntomas pueden representar un problema grave que constituye earline emergencia. No espere a suzanna si los síntomas desaparecen. Solicite atención médica de inmediato. Comuníquese con el servicio de emergencias de solis  localidad (911 en los [place]). No conduzca por van propios medios hasta el hospital.  Resumen  · La acidez estomacal es un tipo de dolor que puede sentirse en la garganta o en el pecho. Puede sentirse nela un dolor urente (ardor). También puede causar mal sabor en la boca que se siente ácido.  · Es posible que deba evitar ciertos alimentos y bebidas para ayudar a aliviar los síntomas. Pregúntele al médico qué alimentos y bebidas debe evitar.  · [place] los medicamentos de venta jerry y los recetados solamente nela se lo haya indicado el médico. No tome aspirina, ibuprofeno ni otros antiinflamatorios no esteroideos (LUIS) a menos que el médico se lo haya indicado.  · Comuníquese con el médico si los síntomas no mejoran o si empeoran.  Esta información no tiene nela fin reemplazar el consejo del médico. Asegúrese de hacerle al médico cualquier pregunta que tenga.  Document Released: 08/29/2012 Document Revised: 06/27/2019 Document Reviewed: 06/27/2019  Elsevier Patient Education © 2020 [employer]

## 2021-04-14 ENCOUNTER — HOSPITAL ENCOUNTER (OUTPATIENT)
Dept: LAB | Facility: MEDICAL CENTER | Age: 66
End: 2021-04-14
Attending: NURSE PRACTITIONER
Payer: MEDICARE

## 2021-04-14 DIAGNOSIS — R42 DIZZINESS: ICD-10-CM

## 2021-04-14 DIAGNOSIS — Z13.220 LIPID SCREENING: ICD-10-CM

## 2021-04-14 DIAGNOSIS — Z11.59 NEED FOR HEPATITIS C SCREENING TEST: ICD-10-CM

## 2021-04-14 DIAGNOSIS — R73.03 PREDIABETES: ICD-10-CM

## 2021-04-14 LAB
ALBUMIN SERPL BCP-MCNC: 3.9 G/DL (ref 3.2–4.9)
ALBUMIN/GLOB SERPL: 1.3 G/DL
ALP SERPL-CCNC: 79 U/L (ref 30–99)
ALT SERPL-CCNC: 26 U/L (ref 2–50)
ANION GAP SERPL CALC-SCNC: 9 MMOL/L (ref 7–16)
AST SERPL-CCNC: 24 U/L (ref 12–45)
BASOPHILS # BLD AUTO: 0.6 % (ref 0–1.8)
BASOPHILS # BLD: 0.03 K/UL (ref 0–0.12)
BILIRUB SERPL-MCNC: 0.4 MG/DL (ref 0.1–1.5)
BUN SERPL-MCNC: 10 MG/DL (ref 8–22)
CALCIUM SERPL-MCNC: 8.9 MG/DL (ref 8.5–10.5)
CHLORIDE SERPL-SCNC: 110 MMOL/L (ref 96–112)
CHOLEST SERPL-MCNC: 191 MG/DL (ref 100–199)
CO2 SERPL-SCNC: 26 MMOL/L (ref 20–33)
CREAT SERPL-MCNC: 0.56 MG/DL (ref 0.5–1.4)
EOSINOPHIL # BLD AUTO: 0.2 K/UL (ref 0–0.51)
EOSINOPHIL NFR BLD: 3.7 % (ref 0–6.9)
ERYTHROCYTE [DISTWIDTH] IN BLOOD BY AUTOMATED COUNT: 49.5 FL (ref 35.9–50)
EST. AVERAGE GLUCOSE BLD GHB EST-MCNC: 117 MG/DL
FASTING STATUS PATIENT QL REPORTED: NORMAL
GLOBULIN SER CALC-MCNC: 2.9 G/DL (ref 1.9–3.5)
GLUCOSE SERPL-MCNC: 93 MG/DL (ref 65–99)
HBA1C MFR BLD: 5.7 % (ref 4–5.6)
HCT VFR BLD AUTO: 40 % (ref 37–47)
HCV AB SER QL: NORMAL
HDLC SERPL-MCNC: 77 MG/DL
HGB BLD-MCNC: 12.8 G/DL (ref 12–16)
IMM GRANULOCYTES # BLD AUTO: 0.01 K/UL (ref 0–0.11)
IMM GRANULOCYTES NFR BLD AUTO: 0.2 % (ref 0–0.9)
LDLC SERPL CALC-MCNC: 104 MG/DL
LYMPHOCYTES # BLD AUTO: 1.66 K/UL (ref 1–4.8)
LYMPHOCYTES NFR BLD: 31 % (ref 22–41)
MCH RBC QN AUTO: 30 PG (ref 27–33)
MCHC RBC AUTO-ENTMCNC: 32 G/DL (ref 33.6–35)
MCV RBC AUTO: 93.9 FL (ref 81.4–97.8)
MONOCYTES # BLD AUTO: 0.35 K/UL (ref 0–0.85)
MONOCYTES NFR BLD AUTO: 6.5 % (ref 0–13.4)
NEUTROPHILS # BLD AUTO: 3.11 K/UL (ref 2–7.15)
NEUTROPHILS NFR BLD: 58 % (ref 44–72)
NRBC # BLD AUTO: 0 K/UL
NRBC BLD-RTO: 0 /100 WBC
PLATELET # BLD AUTO: 219 K/UL (ref 164–446)
PMV BLD AUTO: 11.5 FL (ref 9–12.9)
POTASSIUM SERPL-SCNC: 4.4 MMOL/L (ref 3.6–5.5)
PROT SERPL-MCNC: 6.8 G/DL (ref 6–8.2)
RBC # BLD AUTO: 4.26 M/UL (ref 4.2–5.4)
SODIUM SERPL-SCNC: 145 MMOL/L (ref 135–145)
TRIGL SERPL-MCNC: 51 MG/DL (ref 0–149)
WBC # BLD AUTO: 5.4 K/UL (ref 4.8–10.8)

## 2021-04-14 PROCEDURE — 80053 COMPREHEN METABOLIC PANEL: CPT

## 2021-04-14 PROCEDURE — 36415 COLL VENOUS BLD VENIPUNCTURE: CPT

## 2021-04-14 PROCEDURE — 83036 HEMOGLOBIN GLYCOSYLATED A1C: CPT | Mod: GA

## 2021-04-14 PROCEDURE — 80061 LIPID PANEL: CPT

## 2021-04-14 PROCEDURE — 85025 COMPLETE CBC W/AUTO DIFF WBC: CPT

## 2021-04-14 PROCEDURE — 86803 HEPATITIS C AB TEST: CPT

## 2021-05-27 ENCOUNTER — HOSPITAL ENCOUNTER (OUTPATIENT)
Dept: RADIOLOGY | Facility: MEDICAL CENTER | Age: 66
End: 2021-05-27
Attending: NURSE PRACTITIONER
Payer: MEDICARE

## 2021-05-27 DIAGNOSIS — Z12.31 ENCOUNTER FOR SCREENING MAMMOGRAM FOR MALIGNANT NEOPLASM OF BREAST: ICD-10-CM

## 2021-05-27 PROCEDURE — 77063 BREAST TOMOSYNTHESIS BI: CPT
